# Patient Record
Sex: MALE | Race: WHITE | NOT HISPANIC OR LATINO | Employment: OTHER | URBAN - METROPOLITAN AREA
[De-identification: names, ages, dates, MRNs, and addresses within clinical notes are randomized per-mention and may not be internally consistent; named-entity substitution may affect disease eponyms.]

---

## 2023-04-04 ENCOUNTER — OFFICE VISIT (OUTPATIENT)
Dept: PRIMARY CARE | Facility: CLINIC | Age: 58
End: 2023-04-04
Payer: MEDICARE

## 2023-04-04 VITALS — SYSTOLIC BLOOD PRESSURE: 120 MMHG | DIASTOLIC BLOOD PRESSURE: 82 MMHG | BODY MASS INDEX: 26.85 KG/M2 | WEIGHT: 198 LBS

## 2023-04-04 DIAGNOSIS — F31.10 BIPOLAR DISORDER, CURRENT EPISODE MANIC WITHOUT PSYCHOTIC FEATURES (MULTI): ICD-10-CM

## 2023-04-04 DIAGNOSIS — I10 HYPERTENSION, UNSPECIFIED TYPE: Primary | ICD-10-CM

## 2023-04-04 DIAGNOSIS — F42.9 OBSESSIVE-COMPULSIVE DISORDER, UNSPECIFIED TYPE: ICD-10-CM

## 2023-04-04 PROBLEM — R00.0 SINUS TACHYCARDIA: Status: ACTIVE | Noted: 2023-04-04

## 2023-04-04 PROBLEM — L84 CALLUS OF FOOT: Status: ACTIVE | Noted: 2023-04-04

## 2023-04-04 PROBLEM — S93.529A TURF TOE: Status: ACTIVE | Noted: 2023-04-04

## 2023-04-04 PROBLEM — L30.9 DERMATITIS: Status: ACTIVE | Noted: 2023-04-04

## 2023-04-04 PROBLEM — R25.1 TREMOR: Status: ACTIVE | Noted: 2023-04-04

## 2023-04-04 PROBLEM — E78.2 MIXED HYPERLIPIDEMIA: Status: ACTIVE | Noted: 2023-04-04

## 2023-04-04 PROBLEM — M79.89 MASS OF SOFT TISSUE OF LOWER LEG: Status: ACTIVE | Noted: 2023-04-04

## 2023-04-04 PROBLEM — M79.606 LOWER EXTREMITY PAIN: Status: ACTIVE | Noted: 2023-04-04

## 2023-04-04 PROBLEM — L03.039 PARONYCHIA OF TOE: Status: ACTIVE | Noted: 2023-04-04

## 2023-04-04 PROBLEM — F40.10 SOCIAL ANXIETY DISORDER: Status: ACTIVE | Noted: 2021-06-14

## 2023-04-04 PROBLEM — S90.30XA FOOT CONTUSION: Status: ACTIVE | Noted: 2023-04-04

## 2023-04-04 PROBLEM — R00.2 PALPITATIONS: Status: ACTIVE | Noted: 2023-04-04

## 2023-04-04 PROBLEM — G24.01 DRUG INDUCED SUBACUTE DYSKINESIA: Status: ACTIVE | Noted: 2021-06-14

## 2023-04-04 PROBLEM — M21.70 UNEQUAL LEG LENGTH (ACQUIRED): Status: ACTIVE | Noted: 2023-04-04

## 2023-04-04 PROBLEM — I49.3 PREMATURE VENTRICULAR CONTRACTIONS: Status: ACTIVE | Noted: 2023-04-04

## 2023-04-04 PROBLEM — R06.02 SOB (SHORTNESS OF BREATH) ON EXERTION: Status: ACTIVE | Noted: 2023-04-04

## 2023-04-04 PROBLEM — F84.0 AUTISTIC DISORDER (HHS-HCC): Status: ACTIVE | Noted: 2021-06-14

## 2023-04-04 PROBLEM — E55.9 VITAMIN D DEFICIENCY: Status: ACTIVE | Noted: 2023-04-04

## 2023-04-04 PROBLEM — L60.0 INGROWING NAIL: Status: ACTIVE | Noted: 2023-04-04

## 2023-04-04 PROBLEM — F41.1 GENERALIZED ANXIETY DISORDER: Status: ACTIVE | Noted: 2019-12-13

## 2023-04-04 PROBLEM — B35.3 TINEA PEDIS: Status: ACTIVE | Noted: 2023-04-04

## 2023-04-04 PROBLEM — L85.3 XEROSIS OF SKIN: Status: ACTIVE | Noted: 2023-04-04

## 2023-04-04 PROCEDURE — 99214 OFFICE O/P EST MOD 30 MIN: CPT | Performed by: INTERNAL MEDICINE

## 2023-04-04 PROCEDURE — 3079F DIAST BP 80-89 MM HG: CPT | Performed by: INTERNAL MEDICINE

## 2023-04-04 PROCEDURE — 1036F TOBACCO NON-USER: CPT | Performed by: INTERNAL MEDICINE

## 2023-04-04 PROCEDURE — 3074F SYST BP LT 130 MM HG: CPT | Performed by: INTERNAL MEDICINE

## 2023-04-04 RX ORDER — RISPERIDONE 0.5 MG/1
TABLET ORAL
COMMUNITY
Start: 2019-12-11 | End: 2023-04-04

## 2023-04-04 RX ORDER — LOXAPINE SUCCINATE 10 MG/1
1 TABLET ORAL DAILY
COMMUNITY
Start: 2012-05-25 | End: 2023-04-04

## 2023-04-04 RX ORDER — ESCITALOPRAM OXALATE 10 MG/1
1 TABLET ORAL DAILY
COMMUNITY

## 2023-04-04 RX ORDER — LISINOPRIL AND HYDROCHLOROTHIAZIDE 10; 12.5 MG/1; MG/1
1 TABLET ORAL DAILY
COMMUNITY
Start: 2012-10-25 | End: 2023-09-27

## 2023-04-04 RX ORDER — ALPRAZOLAM 1 MG/1
TABLET ORAL
COMMUNITY

## 2023-04-04 RX ORDER — CLOTRIMAZOLE 1 %
CREAM (GRAM) TOPICAL
COMMUNITY
Start: 2012-10-22

## 2023-04-04 RX ORDER — LOXAPINE SUCCINATE 5 MG/1
TABLET ORAL
COMMUNITY
Start: 2020-01-29

## 2023-04-04 RX ORDER — CLOTRIMAZOLE AND BETAMETHASONE DIPROPIONATE 10; .64 MG/G; MG/G
CREAM TOPICAL
COMMUNITY
Start: 2014-11-10 | End: 2023-04-04

## 2023-04-04 RX ORDER — TOBRAMYCIN 3 MG/ML
SOLUTION/ DROPS OPHTHALMIC
COMMUNITY
Start: 2018-12-05 | End: 2023-04-04

## 2023-04-04 RX ORDER — IBUPROFEN 600 MG/1
TABLET ORAL
COMMUNITY
Start: 2016-11-22 | End: 2023-04-04

## 2023-04-04 RX ORDER — AMMONIUM LACTATE 12 G/100G
CREAM TOPICAL
COMMUNITY
Start: 2018-10-26 | End: 2023-04-04

## 2023-04-04 RX ORDER — METOPROLOL TARTRATE 50 MG/1
0.5 TABLET ORAL 2 TIMES DAILY
COMMUNITY
Start: 2013-02-12 | End: 2023-04-04

## 2023-04-04 RX ORDER — PANTOPRAZOLE SODIUM 20 MG/1
TABLET, DELAYED RELEASE ORAL
COMMUNITY
Start: 2019-03-11 | End: 2023-04-04

## 2023-04-04 RX ORDER — BENZTROPINE MESYLATE 1 MG/1
TABLET ORAL
COMMUNITY
Start: 2012-10-13

## 2023-05-02 DIAGNOSIS — I10 ESSENTIAL (PRIMARY) HYPERTENSION: ICD-10-CM

## 2023-05-02 RX ORDER — METOPROLOL TARTRATE 50 MG/1
TABLET ORAL
Qty: 90 TABLET | Refills: 1 | Status: SHIPPED | OUTPATIENT
Start: 2023-05-02 | End: 2023-09-27

## 2023-07-11 ENCOUNTER — OFFICE VISIT (OUTPATIENT)
Dept: PRIMARY CARE | Facility: CLINIC | Age: 58
End: 2023-07-11
Payer: MEDICARE

## 2023-07-11 VITALS — WEIGHT: 190 LBS | SYSTOLIC BLOOD PRESSURE: 120 MMHG | BODY MASS INDEX: 25.77 KG/M2 | DIASTOLIC BLOOD PRESSURE: 72 MMHG

## 2023-07-11 DIAGNOSIS — F42.9 OBSESSIVE-COMPULSIVE DISORDER, UNSPECIFIED TYPE: ICD-10-CM

## 2023-07-11 DIAGNOSIS — E78.5 HYPERLIPIDEMIA, UNSPECIFIED HYPERLIPIDEMIA TYPE: ICD-10-CM

## 2023-07-11 DIAGNOSIS — R53.83 FATIGUE, UNSPECIFIED TYPE: ICD-10-CM

## 2023-07-11 DIAGNOSIS — I10 HYPERTENSION, UNSPECIFIED TYPE: Primary | ICD-10-CM

## 2023-07-11 DIAGNOSIS — F41.9 ANXIETY: ICD-10-CM

## 2023-07-11 PROCEDURE — 1036F TOBACCO NON-USER: CPT | Performed by: INTERNAL MEDICINE

## 2023-07-11 PROCEDURE — 99214 OFFICE O/P EST MOD 30 MIN: CPT | Performed by: INTERNAL MEDICINE

## 2023-07-11 PROCEDURE — 80053 COMPREHEN METABOLIC PANEL: CPT | Performed by: INTERNAL MEDICINE

## 2023-07-11 PROCEDURE — 3078F DIAST BP <80 MM HG: CPT | Performed by: INTERNAL MEDICINE

## 2023-07-11 PROCEDURE — 80061 LIPID PANEL: CPT | Performed by: INTERNAL MEDICINE

## 2023-07-11 PROCEDURE — 85025 COMPLETE CBC W/AUTO DIFF WBC: CPT | Performed by: INTERNAL MEDICINE

## 2023-07-11 PROCEDURE — 84443 ASSAY THYROID STIM HORMONE: CPT | Performed by: INTERNAL MEDICINE

## 2023-07-11 PROCEDURE — 3074F SYST BP LT 130 MM HG: CPT | Performed by: INTERNAL MEDICINE

## 2023-07-11 NOTE — PROGRESS NOTES
Subjective   Patient ID: Madhu Cavazos is a 58 y.o. male who presents for Follow-up and Hypertension.    HPI   58 years old male comes in to see me to follow-up on his hypertension , he has no specific symptoms.  He moved to will be with his mom.  His brother who is a  in [place] wants to move his mom to [place] before declined lipids better.  But she is not interested he said.  He has no specific complaint.  His medications reviewed and reconciled.  I gave him metoprolol tartrate 50 mg half tablet twice a day and lisinopril HCTZ 10/12.5 mg once a day.  No refill needed yet  Review of Systems  12 system reviewed and 12 systems are negative.  He suffers from OCD.  Bipolar disorder and anxiety.  Objective   /72   Wt 86.2 kg (190 lb)   BMI 25.77 kg/m²     Physical Exam  Awake alert in no distress pleasant cooperative.  Nonicteric sclera no jaundice.  Face symmetrical cranial nerves intact.  He definitely has symptoms of anxiety and OCD.  He keeps moving his upper extremities frequently.  Rubbing them against each other on and off.  Neck supple no masses no lymph node no thyromegaly or jugular venous distention.  Lungs clear no rales no wheezing no crackles.  Heart normal S1 and S2 regular rhythm.  Abdomen benign neurologically intact except the description of his almost choreic movement and compulsive behavior where he keeps moving his upper extremities and rubbing them against each other.  Assessment/Plan   Diagnoses and all orders for this visit:  Hypertension, unspecified type  -     Comprehensive Metabolic Panel  -     CBC  Hyperlipidemia, unspecified hyperlipidemia type  -     Lipid Panel  Fatigue, unspecified type  -     Thyroid Stimulating Hormone  Obsessive-compulsive disorder, unspecified type  Anxiety

## 2023-07-18 ENCOUNTER — TELEPHONE (OUTPATIENT)
Dept: PRIMARY CARE | Facility: CLINIC | Age: 58
End: 2023-07-18
Payer: MEDICARE

## 2023-07-18 NOTE — TELEPHONE ENCOUNTER
----- Message from Vahid Lutz MD sent at 7/17/2023  7:43 AM EDT -----  Regarding: results  Results are normal

## 2023-09-25 DIAGNOSIS — I10 ESSENTIAL (PRIMARY) HYPERTENSION: ICD-10-CM

## 2023-09-27 RX ORDER — METOPROLOL TARTRATE 50 MG/1
TABLET ORAL
Qty: 90 TABLET | Refills: 3 | Status: SHIPPED | OUTPATIENT
Start: 2023-09-27 | End: 2023-10-05 | Stop reason: SDUPTHER

## 2023-09-27 RX ORDER — LISINOPRIL AND HYDROCHLOROTHIAZIDE 10; 12.5 MG/1; MG/1
1 TABLET ORAL DAILY
Qty: 90 TABLET | Refills: 3 | Status: SHIPPED | OUTPATIENT
Start: 2023-09-27 | End: 2023-10-05 | Stop reason: SDUPTHER

## 2023-10-05 ENCOUNTER — OFFICE VISIT (OUTPATIENT)
Dept: PRIMARY CARE | Facility: CLINIC | Age: 58
End: 2023-10-05
Payer: MEDICARE

## 2023-10-05 VITALS
SYSTOLIC BLOOD PRESSURE: 138 MMHG | WEIGHT: 180.8 LBS | BODY MASS INDEX: 24.52 KG/M2 | DIASTOLIC BLOOD PRESSURE: 82 MMHG | TEMPERATURE: 96.8 F

## 2023-10-05 DIAGNOSIS — E78.5 HYPERLIPIDEMIA, UNSPECIFIED HYPERLIPIDEMIA TYPE: ICD-10-CM

## 2023-10-05 DIAGNOSIS — F42.9 OBSESSIVE-COMPULSIVE DISORDER, UNSPECIFIED TYPE: ICD-10-CM

## 2023-10-05 DIAGNOSIS — Z23 FLU VACCINE NEED: Primary | ICD-10-CM

## 2023-10-05 DIAGNOSIS — F41.9 ANXIETY: ICD-10-CM

## 2023-10-05 DIAGNOSIS — F31.10 BIPOLAR DISORDER, CURRENT EPISODE MANIC WITHOUT PSYCHOTIC FEATURES (MULTI): ICD-10-CM

## 2023-10-05 DIAGNOSIS — I10 ESSENTIAL (PRIMARY) HYPERTENSION: ICD-10-CM

## 2023-10-05 PROCEDURE — 99214 OFFICE O/P EST MOD 30 MIN: CPT | Performed by: INTERNAL MEDICINE

## 2023-10-05 PROCEDURE — 90686 IIV4 VACC NO PRSV 0.5 ML IM: CPT | Performed by: INTERNAL MEDICINE

## 2023-10-05 PROCEDURE — 3075F SYST BP GE 130 - 139MM HG: CPT | Performed by: INTERNAL MEDICINE

## 2023-10-05 PROCEDURE — 1036F TOBACCO NON-USER: CPT | Performed by: INTERNAL MEDICINE

## 2023-10-05 PROCEDURE — G0008 ADMIN INFLUENZA VIRUS VAC: HCPCS | Performed by: INTERNAL MEDICINE

## 2023-10-05 PROCEDURE — 3079F DIAST BP 80-89 MM HG: CPT | Performed by: INTERNAL MEDICINE

## 2023-10-05 RX ORDER — LISINOPRIL AND HYDROCHLOROTHIAZIDE 10; 12.5 MG/1; MG/1
1 TABLET ORAL DAILY
Qty: 90 TABLET | Refills: 3 | Status: SHIPPED | OUTPATIENT
Start: 2023-10-05

## 2023-10-05 RX ORDER — METOPROLOL TARTRATE 50 MG/1
50 TABLET ORAL 2 TIMES DAILY
Qty: 90 TABLET | Refills: 3 | Status: SHIPPED | OUTPATIENT
Start: 2023-10-05 | End: 2023-10-05 | Stop reason: SDUPTHER

## 2023-10-05 RX ORDER — METOPROLOL TARTRATE 50 MG/1
25 TABLET ORAL 2 TIMES DAILY
Qty: 90 TABLET | Refills: 3 | Status: SHIPPED | OUTPATIENT
Start: 2023-10-05

## 2023-10-05 RX ORDER — LISINOPRIL AND HYDROCHLOROTHIAZIDE 10; 12.5 MG/1; MG/1
1 TABLET ORAL DAILY
Qty: 90 TABLET | Refills: 3 | Status: SHIPPED | OUTPATIENT
Start: 2023-10-05 | End: 2023-10-05 | Stop reason: SDUPTHER

## 2023-10-05 ASSESSMENT — PAIN SCALES - GENERAL: PAINLEVEL: 0-NO PAIN

## 2023-10-05 NOTE — PROGRESS NOTES
Subjective   Patient ID: Madhu Cavazos is a 58 y.o. male who presents for Follow-up.    HPI   58 years old male comes in to see me today because he needs a refill on all his medication and the reason is he is transferring to [place] with his mother and family in 5 days.  He needs refill on metoprolol tartrate 25 mg twice a day and lisinopril HCTZ 10/12.5 mg once a day.  Otherwise he feels good has no specific symptoms or complaint.  Review of Systems  12 system review 12 system negative for chest pain headache fever chills shortness of breath GI  or neurological symptoms.  Objective   /82   Temp 36 °C (96.8 °F) (Temporal)   Wt 82 kg (180 lb 12.8 oz)   BMI 24.52 kg/m² .  Has a history of social anxiety, generalized anxiety with obsessive-compulsive disorder.  Dyskinesia induced by medication and history of autistic disorder.    Physical Exam  He is alert oriented in no distress.  Nonicteric sclera no jaundice.  Face symmetrical cranial nerves intact.  Neck supple no masses no lymph node thyromegaly or jugular venous distention.  Lungs clear no rales wheezing or crackles.  Heart normal S1 and S2 regular rhythm.  Abdomen benign neurologically intact.  Assessment/Plan   Diagnoses and all orders for this visit:  Flu vaccine need  Essential (primary) hypertension  -     metoprolol tartrate (Lopressor) 50 mg tablet; Take 1 tablet by mouth 2 times a day.  -     lisinopriL-hydrochlorothiazide 10-12.5 mg tablet; Take 1 tablet by mouth once daily.  Anxiety  Obsessive-compulsive disorder, unspecified type  Bipolar disorder, current episode manic without psychotic features (CMS/HCC)  Hyperlipidemia, unspecified hyperlipidemia type  Other orders  -     Flu vaccine (IIV4) age 6 months and greater, preservative free

## 2023-10-12 ENCOUNTER — APPOINTMENT (OUTPATIENT)
Dept: PRIMARY CARE | Facility: CLINIC | Age: 58
End: 2023-10-12
Payer: MEDICARE

## 2023-10-16 NOTE — PROGRESS NOTES
Subjective   Patient ID: Madhu Cavazos is a 58 y.o. male who presents for Follow-up.    HPI   58 years old male comes in to see me today for a follow-up visit.  He has bipolar disorder anxiety and other medical issues including hypertension.  He is under the care of psychiatrist.  His medications reviewed and reconciled.  No need for any refills.  Review of Systems  All systems reviewed and 12 systems are negative.  Objective   /82 (BP Location: Right arm, Patient Position: Sitting, BP Cuff Size: Adult)   Wt 89.8 kg (198 lb)   BMI 26.85 kg/m²     Physical Exam  He is alert oriented in no acute distress very pleasant and cooperative.  He moved with his mother now and he is helping her.   has multiple medical issues.  Reminded him to do Cologuard.  Nonicteric sclera or jaundice.  Face symmetrical cranial nerves intact.  Abdomen benign nontender no masses no organomegaly.  Lungs clear heart normal S1 and S2 regular rhythm.  Abdomen benign neurological no deficits.  Assessment/Plan   Diagnoses and all orders for this visit:  Hypertension, unspecified type  Bipolar disorder, current episode manic without psychotic features (CMS/HCC)  Obsessive-compulsive disorder, unspecified type         
36.8

## 2024-12-25 DIAGNOSIS — I10 ESSENTIAL (PRIMARY) HYPERTENSION: ICD-10-CM

## 2024-12-26 DIAGNOSIS — I10 ESSENTIAL (PRIMARY) HYPERTENSION: ICD-10-CM

## 2024-12-26 RX ORDER — METOPROLOL TARTRATE 50 MG/1
25 TABLET ORAL 2 TIMES DAILY
Qty: 30 TABLET | Refills: 0 | Status: SHIPPED | OUTPATIENT
Start: 2024-12-26

## 2024-12-26 RX ORDER — LISINOPRIL AND HYDROCHLOROTHIAZIDE 10; 12.5 MG/1; MG/1
1 TABLET ORAL DAILY
Qty: 30 TABLET | Refills: 0 | Status: SHIPPED | OUTPATIENT
Start: 2024-12-26

## 2025-01-23 DIAGNOSIS — I10 ESSENTIAL (PRIMARY) HYPERTENSION: ICD-10-CM

## 2025-01-23 RX ORDER — METOPROLOL TARTRATE 50 MG/1
25 TABLET ORAL 2 TIMES DAILY
Qty: 90 TABLET | Refills: 1 | Status: SHIPPED | OUTPATIENT
Start: 2025-01-23

## 2025-01-23 RX ORDER — LISINOPRIL AND HYDROCHLOROTHIAZIDE 10; 12.5 MG/1; MG/1
1 TABLET ORAL DAILY
Qty: 90 TABLET | Refills: 1 | Status: SHIPPED | OUTPATIENT
Start: 2025-01-23